# Patient Record
Sex: FEMALE | Race: WHITE | URBAN - METROPOLITAN AREA
[De-identification: names, ages, dates, MRNs, and addresses within clinical notes are randomized per-mention and may not be internally consistent; named-entity substitution may affect disease eponyms.]

---

## 2020-11-09 ENCOUNTER — APPOINTMENT (RX ONLY)
Dept: URBAN - METROPOLITAN AREA CLINIC 74 | Facility: CLINIC | Age: 40
Setting detail: DERMATOLOGY
End: 2020-11-09

## 2020-11-09 DIAGNOSIS — L81.4 OTHER MELANIN HYPERPIGMENTATION: ICD-10-CM

## 2020-11-09 DIAGNOSIS — D22 MELANOCYTIC NEVI: ICD-10-CM

## 2020-11-09 DIAGNOSIS — L81.3 CAFÉ AU LAIT SPOTS: ICD-10-CM

## 2020-11-09 DIAGNOSIS — L21.8 OTHER SEBORRHEIC DERMATITIS: ICD-10-CM

## 2020-11-09 DIAGNOSIS — D18.0 HEMANGIOMA: ICD-10-CM

## 2020-11-09 PROBLEM — D22.71 MELANOCYTIC NEVI OF RIGHT LOWER LIMB, INCLUDING HIP: Status: ACTIVE | Noted: 2020-11-09

## 2020-11-09 PROBLEM — D18.01 HEMANGIOMA OF SKIN AND SUBCUTANEOUS TISSUE: Status: ACTIVE | Noted: 2020-11-09

## 2020-11-09 PROBLEM — D22.5 MELANOCYTIC NEVI OF TRUNK: Status: ACTIVE | Noted: 2020-11-09

## 2020-11-09 PROBLEM — D48.5 NEOPLASM OF UNCERTAIN BEHAVIOR OF SKIN: Status: ACTIVE | Noted: 2020-11-09

## 2020-11-09 PROBLEM — D22.61 MELANOCYTIC NEVI OF RIGHT UPPER LIMB, INCLUDING SHOULDER: Status: ACTIVE | Noted: 2020-11-09

## 2020-11-09 PROBLEM — D22.72 MELANOCYTIC NEVI OF LEFT LOWER LIMB, INCLUDING HIP: Status: ACTIVE | Noted: 2020-11-09

## 2020-11-09 PROCEDURE — ? TREATMENT REGIMEN

## 2020-11-09 PROCEDURE — 99203 OFFICE O/P NEW LOW 30 MIN: CPT | Mod: 25

## 2020-11-09 PROCEDURE — ? FULL BODY SKIN EXAM

## 2020-11-09 PROCEDURE — ? PRESCRIPTION

## 2020-11-09 PROCEDURE — ? BIOPSY BY SHAVE METHOD

## 2020-11-09 PROCEDURE — ? COUNSELING

## 2020-11-09 PROCEDURE — 11102 TANGNTL BX SKIN SINGLE LES: CPT

## 2020-11-09 RX ORDER — FLUOCINOLONE ACETONIDE 0.1 MG/ML
1 SOLUTION TOPICAL QD
Qty: 1 | Refills: 1 | Status: ERX | COMMUNITY
Start: 2020-11-09

## 2020-11-09 RX ADMIN — FLUOCINOLONE ACETONIDE 1: 0.1 SOLUTION TOPICAL at 00:00

## 2020-11-09 ASSESSMENT — LOCATION DETAILED DESCRIPTION DERM
LOCATION DETAILED: RIGHT PROXIMAL PRETIBIAL REGION
LOCATION DETAILED: RIGHT DISTAL POSTERIOR THIGH
LOCATION DETAILED: RIGHT RADIAL DORSAL HAND
LOCATION DETAILED: LEFT ANTERIOR DISTAL THIGH
LOCATION DETAILED: LEFT ANTERIOR LATERAL PROXIMAL THIGH
LOCATION DETAILED: LEFT PROXIMAL RADIAL DORSAL FOREARM
LOCATION DETAILED: LEFT MID-UPPER BACK
LOCATION DETAILED: PERIUMBILICAL SKIN
LOCATION DETAILED: LEFT SUPERIOR MEDIAL UPPER BACK
LOCATION DETAILED: LEFT POSTERIOR SHOULDER
LOCATION DETAILED: LEFT ANTERIOR PROXIMAL THIGH
LOCATION DETAILED: RIGHT ANTERIOR PROXIMAL THIGH
LOCATION DETAILED: LEFT FOREHEAD
LOCATION DETAILED: RIGHT MID-UPPER BACK
LOCATION DETAILED: LEFT PROXIMAL PRETIBIAL REGION
LOCATION DETAILED: RIGHT PROXIMAL DORSAL FOREARM
LOCATION DETAILED: RIGHT POSTERIOR SHOULDER
LOCATION DETAILED: RIGHT SUPERIOR MEDIAL FOREHEAD
LOCATION DETAILED: RIGHT SUPERIOR LATERAL MIDBACK

## 2020-11-09 ASSESSMENT — LOCATION ZONE DERM
LOCATION ZONE: ARM
LOCATION ZONE: FACE
LOCATION ZONE: LEG
LOCATION ZONE: HAND
LOCATION ZONE: TRUNK

## 2020-11-09 ASSESSMENT — LOCATION SIMPLE DESCRIPTION DERM
LOCATION SIMPLE: RIGHT FOREHEAD
LOCATION SIMPLE: RIGHT SHOULDER
LOCATION SIMPLE: RIGHT FOREARM
LOCATION SIMPLE: RIGHT HAND
LOCATION SIMPLE: RIGHT UPPER BACK
LOCATION SIMPLE: LEFT PRETIBIAL REGION
LOCATION SIMPLE: LEFT SHOULDER
LOCATION SIMPLE: RIGHT PRETIBIAL REGION
LOCATION SIMPLE: LEFT UPPER BACK
LOCATION SIMPLE: RIGHT THIGH
LOCATION SIMPLE: LEFT FOREHEAD
LOCATION SIMPLE: RIGHT LOWER BACK
LOCATION SIMPLE: LEFT FOREARM
LOCATION SIMPLE: ABDOMEN
LOCATION SIMPLE: LEFT THIGH
LOCATION SIMPLE: RIGHT POSTERIOR THIGH

## 2020-11-09 NOTE — PROCEDURE: MIPS QUALITY
Quality 130: Documentation Of Current Medications In The Medical Record: Eligible clinician attests to documenting in the medical record the patient is not eligible for a current list of medications being obtained, updated, or reviewed by the eligible clinician
Detail Level: Detailed
Quality 226: Preventive Care And Screening: Tobacco Use: Screening And Cessation Intervention: Patient screened for tobacco use and is an ex/non-smoker
Quality 431: Preventive Care And Screening: Unhealthy Alcohol Use - Screening: Patient screened for unhealthy alcohol use using a single question and scores less than 2 times per year

## 2020-11-09 NOTE — PROCEDURE: TREATMENT REGIMEN
Initiate Treatment: Use natural shampoos and fluocinolone .1% solution apply every other day, taper as improves
Detail Level: Zone

## 2022-08-03 ENCOUNTER — APPOINTMENT (RX ONLY)
Dept: URBAN - METROPOLITAN AREA CLINIC 74 | Facility: CLINIC | Age: 42
Setting detail: DERMATOLOGY
End: 2022-08-03

## 2022-08-03 DIAGNOSIS — D22 MELANOCYTIC NEVI: ICD-10-CM

## 2022-08-03 DIAGNOSIS — L81.3 CAFÉ AU LAIT SPOTS: ICD-10-CM

## 2022-08-03 DIAGNOSIS — D485 NEOPLASM OF UNCERTAIN BEHAVIOR OF SKIN: ICD-10-CM

## 2022-08-03 DIAGNOSIS — D18.0 HEMANGIOMA: ICD-10-CM

## 2022-08-03 DIAGNOSIS — L81.4 OTHER MELANIN HYPERPIGMENTATION: ICD-10-CM

## 2022-08-03 PROBLEM — D22.72 MELANOCYTIC NEVI OF LEFT LOWER LIMB, INCLUDING HIP: Status: ACTIVE | Noted: 2022-08-03

## 2022-08-03 PROBLEM — D22.71 MELANOCYTIC NEVI OF RIGHT LOWER LIMB, INCLUDING HIP: Status: ACTIVE | Noted: 2022-08-03

## 2022-08-03 PROBLEM — D22.61 MELANOCYTIC NEVI OF RIGHT UPPER LIMB, INCLUDING SHOULDER: Status: ACTIVE | Noted: 2022-08-03

## 2022-08-03 PROBLEM — D18.01 HEMANGIOMA OF SKIN AND SUBCUTANEOUS TISSUE: Status: ACTIVE | Noted: 2022-08-03

## 2022-08-03 PROBLEM — D22.5 MELANOCYTIC NEVI OF TRUNK: Status: ACTIVE | Noted: 2022-08-03

## 2022-08-03 PROBLEM — D48.5 NEOPLASM OF UNCERTAIN BEHAVIOR OF SKIN: Status: ACTIVE | Noted: 2022-08-03

## 2022-08-03 PROCEDURE — 11103 TANGNTL BX SKIN EA SEP/ADDL: CPT

## 2022-08-03 PROCEDURE — 11102 TANGNTL BX SKIN SINGLE LES: CPT

## 2022-08-03 PROCEDURE — ? BIOPSY BY SHAVE METHOD

## 2022-08-03 PROCEDURE — ? COUNSELING

## 2022-08-03 PROCEDURE — ? TREATMENT REGIMEN

## 2022-08-03 PROCEDURE — 99213 OFFICE O/P EST LOW 20 MIN: CPT | Mod: 25

## 2022-08-03 PROCEDURE — ? FULL BODY SKIN EXAM

## 2022-08-03 ASSESSMENT — LOCATION SIMPLE DESCRIPTION DERM
LOCATION SIMPLE: LEFT FOREARM
LOCATION SIMPLE: RIGHT FOREARM
LOCATION SIMPLE: RIGHT THIGH
LOCATION SIMPLE: RIGHT POSTERIOR THIGH
LOCATION SIMPLE: RIGHT HAND
LOCATION SIMPLE: RIGHT PRETIBIAL REGION
LOCATION SIMPLE: LEFT PRETIBIAL REGION
LOCATION SIMPLE: LEFT THIGH
LOCATION SIMPLE: LEFT UPPER BACK
LOCATION SIMPLE: RIGHT SHOULDER
LOCATION SIMPLE: ABDOMEN
LOCATION SIMPLE: RIGHT UPPER BACK
LOCATION SIMPLE: RIGHT LOWER BACK
LOCATION SIMPLE: LEFT SHOULDER

## 2022-08-03 ASSESSMENT — LOCATION DETAILED DESCRIPTION DERM
LOCATION DETAILED: LEFT SUPERIOR LATERAL UPPER BACK
LOCATION DETAILED: RIGHT PROXIMAL DORSAL FOREARM
LOCATION DETAILED: RIGHT ANTERIOR PROXIMAL THIGH
LOCATION DETAILED: LEFT SUPERIOR MEDIAL UPPER BACK
LOCATION DETAILED: LEFT ANTERIOR DISTAL THIGH
LOCATION DETAILED: LEFT MID-UPPER BACK
LOCATION DETAILED: LEFT PROXIMAL RADIAL DORSAL FOREARM
LOCATION DETAILED: RIGHT RADIAL DORSAL HAND
LOCATION DETAILED: LEFT ANTERIOR PROXIMAL THIGH
LOCATION DETAILED: LEFT PROXIMAL PRETIBIAL REGION
LOCATION DETAILED: RIGHT PROXIMAL PRETIBIAL REGION
LOCATION DETAILED: LEFT POSTERIOR SHOULDER
LOCATION DETAILED: RIGHT DISTAL POSTERIOR THIGH
LOCATION DETAILED: RIGHT POSTERIOR SHOULDER
LOCATION DETAILED: RIGHT ANTERIOR LATERAL PROXIMAL THIGH
LOCATION DETAILED: PERIUMBILICAL SKIN
LOCATION DETAILED: RIGHT SUPERIOR LATERAL MIDBACK
LOCATION DETAILED: RIGHT MID-UPPER BACK

## 2022-08-03 ASSESSMENT — LOCATION ZONE DERM
LOCATION ZONE: HAND
LOCATION ZONE: LEG
LOCATION ZONE: TRUNK
LOCATION ZONE: ARM

## 2023-09-26 ENCOUNTER — APPOINTMENT (RX ONLY)
Dept: URBAN - METROPOLITAN AREA CLINIC 74 | Facility: CLINIC | Age: 43
Setting detail: DERMATOLOGY
End: 2023-09-26

## 2023-09-26 DIAGNOSIS — D485 NEOPLASM OF UNCERTAIN BEHAVIOR OF SKIN: ICD-10-CM

## 2023-09-26 DIAGNOSIS — D22 MELANOCYTIC NEVI: ICD-10-CM

## 2023-09-26 DIAGNOSIS — D18.0 HEMANGIOMA: ICD-10-CM

## 2023-09-26 DIAGNOSIS — L81.3 CAFÉ AU LAIT SPOTS: ICD-10-CM

## 2023-09-26 DIAGNOSIS — L81.4 OTHER MELANIN HYPERPIGMENTATION: ICD-10-CM

## 2023-09-26 PROBLEM — D22.61 MELANOCYTIC NEVI OF RIGHT UPPER LIMB, INCLUDING SHOULDER: Status: ACTIVE | Noted: 2023-09-26

## 2023-09-26 PROBLEM — D48.5 NEOPLASM OF UNCERTAIN BEHAVIOR OF SKIN: Status: ACTIVE | Noted: 2023-09-26

## 2023-09-26 PROBLEM — D22.72 MELANOCYTIC NEVI OF LEFT LOWER LIMB, INCLUDING HIP: Status: ACTIVE | Noted: 2023-09-26

## 2023-09-26 PROBLEM — D22.5 MELANOCYTIC NEVI OF TRUNK: Status: ACTIVE | Noted: 2023-09-26

## 2023-09-26 PROBLEM — D22.71 MELANOCYTIC NEVI OF RIGHT LOWER LIMB, INCLUDING HIP: Status: ACTIVE | Noted: 2023-09-26

## 2023-09-26 PROBLEM — D18.01 HEMANGIOMA OF SKIN AND SUBCUTANEOUS TISSUE: Status: ACTIVE | Noted: 2023-09-26

## 2023-09-26 PROCEDURE — 99213 OFFICE O/P EST LOW 20 MIN: CPT | Mod: 25

## 2023-09-26 PROCEDURE — ? COUNSELING

## 2023-09-26 PROCEDURE — ? TREATMENT REGIMEN

## 2023-09-26 PROCEDURE — ? BIOPSY BY SHAVE METHOD

## 2023-09-26 PROCEDURE — 11102 TANGNTL BX SKIN SINGLE LES: CPT

## 2023-09-26 ASSESSMENT — LOCATION SIMPLE DESCRIPTION DERM
LOCATION SIMPLE: RIGHT SHOULDER
LOCATION SIMPLE: LEFT PRETIBIAL REGION
LOCATION SIMPLE: LEFT UPPER BACK
LOCATION SIMPLE: LEFT FOREARM
LOCATION SIMPLE: RIGHT PRETIBIAL REGION
LOCATION SIMPLE: RIGHT LOWER BACK
LOCATION SIMPLE: RIGHT UPPER BACK
LOCATION SIMPLE: RIGHT HAND
LOCATION SIMPLE: RIGHT THIGH
LOCATION SIMPLE: ABDOMEN
LOCATION SIMPLE: RIGHT FOREARM
LOCATION SIMPLE: RIGHT POSTERIOR THIGH
LOCATION SIMPLE: LEFT SHOULDER
LOCATION SIMPLE: LEFT POSTERIOR THIGH
LOCATION SIMPLE: LEFT THIGH

## 2023-09-26 ASSESSMENT — LOCATION DETAILED DESCRIPTION DERM
LOCATION DETAILED: LEFT MID-UPPER BACK
LOCATION DETAILED: RIGHT SUPERIOR LATERAL MIDBACK
LOCATION DETAILED: RIGHT POSTERIOR SHOULDER
LOCATION DETAILED: RIGHT ANTERIOR PROXIMAL THIGH
LOCATION DETAILED: LEFT PROXIMAL RADIAL DORSAL FOREARM
LOCATION DETAILED: LEFT SUPERIOR MEDIAL UPPER BACK
LOCATION DETAILED: PERIUMBILICAL SKIN
LOCATION DETAILED: RIGHT DISTAL POSTERIOR THIGH
LOCATION DETAILED: RIGHT RADIAL DORSAL HAND
LOCATION DETAILED: RIGHT PROXIMAL PRETIBIAL REGION
LOCATION DETAILED: RIGHT MID-UPPER BACK
LOCATION DETAILED: LEFT ANTERIOR DISTAL THIGH
LOCATION DETAILED: RIGHT ANTERIOR LATERAL PROXIMAL THIGH
LOCATION DETAILED: RIGHT PROXIMAL DORSAL FOREARM
LOCATION DETAILED: LEFT POSTERIOR SHOULDER
LOCATION DETAILED: LEFT PROXIMAL POSTERIOR THIGH
LOCATION DETAILED: LEFT PROXIMAL PRETIBIAL REGION

## 2023-09-26 ASSESSMENT — LOCATION ZONE DERM
LOCATION ZONE: HAND
LOCATION ZONE: LEG
LOCATION ZONE: TRUNK
LOCATION ZONE: ARM

## 2023-09-26 NOTE — HPI: EVALUATION OF SKIN LESION(S)
What Type Of Note Output Would You Prefer (Optional)?: Bullet Format
Hpi Title: Evaluation of Skin Lesions
How Severe Are Your Spot(S)?: moderate
Have Your Spot(S) Been Treated In The Past?: has not been treated
Additional History: Inquired about retinol

## 2024-01-09 ENCOUNTER — APPOINTMENT (RX ONLY)
Dept: URBAN - METROPOLITAN AREA CLINIC 74 | Facility: CLINIC | Age: 44
Setting detail: DERMATOLOGY
End: 2024-01-09

## 2024-01-09 DIAGNOSIS — L81.4 OTHER MELANIN HYPERPIGMENTATION: ICD-10-CM

## 2024-01-09 PROCEDURE — ? PRESCRIPTION

## 2024-01-09 RX ORDER — TRETIONIN 0.25 MG/G
APPLY CREAM TOPICAL QHS
Qty: 45 | Refills: 2 | Status: ERX | COMMUNITY
Start: 2024-01-09

## 2024-01-09 RX ADMIN — TRETIONIN APPLY: 0.25 CREAM TOPICAL at 00:00

## 2024-08-05 ENCOUNTER — APPOINTMENT (RX ONLY)
Dept: URBAN - METROPOLITAN AREA CLINIC 74 | Facility: CLINIC | Age: 44
Setting detail: DERMATOLOGY
End: 2024-08-05

## 2024-08-05 DIAGNOSIS — L81.4 OTHER MELANIN HYPERPIGMENTATION: ICD-10-CM

## 2024-08-05 PROCEDURE — ? PRESCRIPTION

## 2024-08-05 RX ORDER — TRETIONIN 0.25 MG/G
CREAM TOPICAL QHS
Qty: 45 | Refills: 0 | Status: ERX

## 2025-02-18 ENCOUNTER — APPOINTMENT (OUTPATIENT)
Dept: URBAN - METROPOLITAN AREA CLINIC 74 | Facility: CLINIC | Age: 45
Setting detail: DERMATOLOGY
End: 2025-02-18

## 2025-02-18 DIAGNOSIS — L81.4 OTHER MELANIN HYPERPIGMENTATION: ICD-10-CM

## 2025-02-18 DIAGNOSIS — L91.8 OTHER HYPERTROPHIC DISORDERS OF THE SKIN: ICD-10-CM

## 2025-02-18 DIAGNOSIS — L82.1 OTHER SEBORRHEIC KERATOSIS: ICD-10-CM

## 2025-02-18 DIAGNOSIS — D22 MELANOCYTIC NEVI: ICD-10-CM | Status: WELL CONTROLLED

## 2025-02-18 DIAGNOSIS — D485 NEOPLASM OF UNCERTAIN BEHAVIOR OF SKIN: ICD-10-CM

## 2025-02-18 DIAGNOSIS — D18.0 HEMANGIOMA: ICD-10-CM

## 2025-02-18 DIAGNOSIS — Z71.89 OTHER SPECIFIED COUNSELING: ICD-10-CM

## 2025-02-18 DIAGNOSIS — L57.8 OTHER SKIN CHANGES DUE TO CHRONIC EXPOSURE TO NONIONIZING RADIATION: ICD-10-CM

## 2025-02-18 PROBLEM — D48.5 NEOPLASM OF UNCERTAIN BEHAVIOR OF SKIN: Status: ACTIVE | Noted: 2025-02-18

## 2025-02-18 PROBLEM — D18.01 HEMANGIOMA OF SKIN AND SUBCUTANEOUS TISSUE: Status: ACTIVE | Noted: 2025-02-18

## 2025-02-18 PROBLEM — D22.5 MELANOCYTIC NEVI OF TRUNK: Status: ACTIVE | Noted: 2025-02-18

## 2025-02-18 PROCEDURE — ? SUNSCREEN TREATMENT REGIMEN

## 2025-02-18 PROCEDURE — ? FULL BODY SKIN EXAM

## 2025-02-18 PROCEDURE — 11102 TANGNTL BX SKIN SINGLE LES: CPT

## 2025-02-18 PROCEDURE — ? BIOPSY BY SHAVE METHOD

## 2025-02-18 PROCEDURE — ? PRESCRIPTION

## 2025-02-18 PROCEDURE — ? COUNSELING

## 2025-02-18 PROCEDURE — ? SUNSCREEN RECOMMENDATIONS

## 2025-02-18 PROCEDURE — 99213 OFFICE O/P EST LOW 20 MIN: CPT | Mod: 25

## 2025-02-18 PROCEDURE — 11103 TANGNTL BX SKIN EA SEP/ADDL: CPT

## 2025-02-18 RX ORDER — TRETIONIN 0.5 MG/G
CREAM TOPICAL
Qty: 45 | Refills: 3 | Status: ERX | COMMUNITY
Start: 2025-02-18

## 2025-02-18 RX ADMIN — TRETIONIN: 0.5 CREAM TOPICAL at 00:00

## 2025-02-18 ASSESSMENT — LOCATION SIMPLE DESCRIPTION DERM
LOCATION SIMPLE: LEFT UPPER BACK
LOCATION SIMPLE: RIGHT UPPER BACK
LOCATION SIMPLE: LEFT POSTERIOR AXILLA
LOCATION SIMPLE: RIGHT POSTERIOR AXILLA
LOCATION SIMPLE: RIGHT ANTERIOR AXILLA
LOCATION SIMPLE: RIGHT CHEEK
LOCATION SIMPLE: LEFT CHEEK
LOCATION SIMPLE: LEFT LOWER BACK
LOCATION SIMPLE: ABDOMEN

## 2025-02-18 ASSESSMENT — LOCATION DETAILED DESCRIPTION DERM
LOCATION DETAILED: RIGHT INFERIOR CENTRAL MALAR CHEEK
LOCATION DETAILED: RIGHT LATERAL UPPER BACK
LOCATION DETAILED: RIGHT INFERIOR MEDIAL UPPER BACK
LOCATION DETAILED: LEFT POSTERIOR AXILLA
LOCATION DETAILED: RIGHT MID-UPPER BACK
LOCATION DETAILED: RIGHT POSTERIOR AXILLA
LOCATION DETAILED: LEFT INFERIOR CENTRAL MALAR CHEEK
LOCATION DETAILED: RIGHT ANTERIOR AXILLA
LOCATION DETAILED: LEFT SUPERIOR UPPER BACK
LOCATION DETAILED: EPIGASTRIC SKIN
LOCATION DETAILED: LEFT INFERIOR MEDIAL UPPER BACK
LOCATION DETAILED: LEFT SUPERIOR LATERAL LOWER BACK

## 2025-02-18 ASSESSMENT — LOCATION ZONE DERM
LOCATION ZONE: AXILLAE
LOCATION ZONE: TRUNK
LOCATION ZONE: FACE

## 2025-02-18 NOTE — HPI: EVALUATION OF SKIN LESION(S)
What Type Of Note Output Would You Prefer (Optional)?: Standard Output
Hpi Title: Evaluation of Skin Lesions
How Severe Are Your Spot(S)?: mild
Have Your Spot(S) Been Treated In The Past?: has not been treated
Additional History: Pt has spots on the back.

## 2025-05-29 ENCOUNTER — APPOINTMENT (OUTPATIENT)
Dept: URBAN - METROPOLITAN AREA CLINIC 74 | Facility: CLINIC | Age: 45
Setting detail: DERMATOLOGY
End: 2025-05-29

## 2025-05-29 DIAGNOSIS — L57.8 OTHER SKIN CHANGES DUE TO CHRONIC EXPOSURE TO NONIONIZING RADIATION: ICD-10-CM

## 2025-05-29 DIAGNOSIS — D22 MELANOCYTIC NEVI: ICD-10-CM

## 2025-05-29 DIAGNOSIS — Z71.89 OTHER SPECIFIED COUNSELING: ICD-10-CM

## 2025-05-29 DIAGNOSIS — D18.0 HEMANGIOMA: ICD-10-CM

## 2025-05-29 DIAGNOSIS — L82.1 OTHER SEBORRHEIC KERATOSIS: ICD-10-CM

## 2025-05-29 DIAGNOSIS — L81.4 OTHER MELANIN HYPERPIGMENTATION: ICD-10-CM

## 2025-05-29 PROBLEM — D22.5 MELANOCYTIC NEVI OF TRUNK: Status: ACTIVE | Noted: 2025-05-29

## 2025-05-29 PROBLEM — D18.01 HEMANGIOMA OF SKIN AND SUBCUTANEOUS TISSUE: Status: ACTIVE | Noted: 2025-05-29

## 2025-05-29 PROCEDURE — ? FULL BODY SKIN EXAM

## 2025-05-29 PROCEDURE — ? SUNSCREEN RECOMMENDATIONS

## 2025-05-29 PROCEDURE — ? COUNSELING

## 2025-05-29 PROCEDURE — ? PRESCRIPTION

## 2025-05-29 PROCEDURE — ? SUNSCREEN TREATMENT REGIMEN

## 2025-05-29 PROCEDURE — 99213 OFFICE O/P EST LOW 20 MIN: CPT

## 2025-05-29 PROCEDURE — ? ADDITIONAL NOTES

## 2025-05-29 PROCEDURE — ? DEFER

## 2025-05-29 RX ORDER — TRETIONIN 0.5 MG/G
CREAM TOPICAL
Qty: 45 | Refills: 3 | Status: ERX

## 2025-05-29 ASSESSMENT — LOCATION DETAILED DESCRIPTION DERM
LOCATION DETAILED: RIGHT INFERIOR MEDIAL UPPER BACK
LOCATION DETAILED: EPIGASTRIC SKIN
LOCATION DETAILED: RIGHT MID-UPPER BACK
LOCATION DETAILED: LEFT INFERIOR MEDIAL UPPER BACK
LOCATION DETAILED: LEFT SUPERIOR UPPER BACK
LOCATION DETAILED: RIGHT INFERIOR CENTRAL MALAR CHEEK
LOCATION DETAILED: LEFT SUPERIOR LATERAL LOWER BACK
LOCATION DETAILED: RIGHT LATERAL UPPER BACK
LOCATION DETAILED: LEFT INFERIOR CENTRAL MALAR CHEEK

## 2025-05-29 ASSESSMENT — LOCATION SIMPLE DESCRIPTION DERM
LOCATION SIMPLE: RIGHT UPPER BACK
LOCATION SIMPLE: LEFT CHEEK
LOCATION SIMPLE: RIGHT CHEEK
LOCATION SIMPLE: LEFT LOWER BACK
LOCATION SIMPLE: LEFT UPPER BACK
LOCATION SIMPLE: ABDOMEN

## 2025-05-29 ASSESSMENT — LOCATION ZONE DERM
LOCATION ZONE: TRUNK
LOCATION ZONE: FACE

## 2025-05-29 NOTE — PROCEDURE: ADDITIONAL NOTES
Render Risk Assessment In Note?: no
Additional Notes: Monitoring, picture taken
Detail Level: Simple

## 2025-05-29 NOTE — PROCEDURE: DEFER
Introduction Text (Please End With A Colon): The following procedure was deferred: removal
X Size Of Lesion In Cm (Optional): 0
Reason To Defer Override: excision with Ludy
Detail Level: Detailed